# Patient Record
Sex: MALE | Race: BLACK OR AFRICAN AMERICAN | NOT HISPANIC OR LATINO | Employment: FULL TIME | URBAN - METROPOLITAN AREA
[De-identification: names, ages, dates, MRNs, and addresses within clinical notes are randomized per-mention and may not be internally consistent; named-entity substitution may affect disease eponyms.]

---

## 2021-06-03 ENCOUNTER — RECORDS - HEALTHEAST (OUTPATIENT)
Dept: ADMINISTRATIVE | Facility: CLINIC | Age: 46
End: 2021-06-03

## 2024-02-23 ENCOUNTER — HOSPITAL ENCOUNTER (EMERGENCY)
Facility: HOSPITAL | Age: 49
Discharge: HOME | End: 2024-02-23
Attending: EMERGENCY MEDICINE | Admitting: EMERGENCY MEDICINE
Payer: COMMERCIAL

## 2024-02-23 ENCOUNTER — APPOINTMENT (OUTPATIENT)
Dept: RADIOLOGY | Facility: HOSPITAL | Age: 49
End: 2024-02-23
Attending: EMERGENCY MEDICINE
Payer: COMMERCIAL

## 2024-02-23 VITALS
RESPIRATION RATE: 16 BRPM | SYSTOLIC BLOOD PRESSURE: 164 MMHG | OXYGEN SATURATION: 100 % | TEMPERATURE: 98.4 F | DIASTOLIC BLOOD PRESSURE: 97 MMHG | WEIGHT: 216.5 LBS | HEART RATE: 76 BPM

## 2024-02-23 DIAGNOSIS — R00.2 PALPITATIONS: ICD-10-CM

## 2024-02-23 DIAGNOSIS — I10 ACCELERATED HYPERTENSION: ICD-10-CM

## 2024-02-23 LAB
ALBUMIN UR-MCNC: NEGATIVE MG/DL
ANION GAP SERPL CALCULATED.3IONS-SCNC: 10 MMOL/L (ref 7–15)
APPEARANCE UR: CLEAR
BILIRUB UR QL STRIP: NEGATIVE
BUN SERPL-MCNC: 12.1 MG/DL (ref 6–20)
CALCIUM SERPL-MCNC: 9.1 MG/DL (ref 8.6–10)
CHLORIDE SERPL-SCNC: 105 MMOL/L (ref 98–107)
COLOR UR AUTO: COLORLESS
CREAT SERPL-MCNC: 1.23 MG/DL (ref 0.67–1.17)
DEPRECATED HCO3 PLAS-SCNC: 27 MMOL/L (ref 22–29)
EGFRCR SERPLBLD CKD-EPI 2021: 72 ML/MIN/1.73M2
ERYTHROCYTE [DISTWIDTH] IN BLOOD BY AUTOMATED COUNT: 13.7 % (ref 10–15)
GLUCOSE SERPL-MCNC: 104 MG/DL (ref 70–99)
GLUCOSE UR STRIP-MCNC: NEGATIVE MG/DL
HCT VFR BLD AUTO: 43.6 % (ref 40–53)
HGB BLD-MCNC: 14.4 G/DL (ref 13.3–17.7)
HGB UR QL STRIP: NEGATIVE
KETONES UR STRIP-MCNC: NEGATIVE MG/DL
LEUKOCYTE ESTERASE UR QL STRIP: NEGATIVE
MCH RBC QN AUTO: 27.6 PG (ref 26.5–33)
MCHC RBC AUTO-ENTMCNC: 33 G/DL (ref 31.5–36.5)
MCV RBC AUTO: 84 FL (ref 78–100)
NITRATE UR QL: NEGATIVE
PH UR STRIP: 5.5 [PH] (ref 5–7)
PLATELET # BLD AUTO: 238 10E3/UL (ref 150–450)
POTASSIUM SERPL-SCNC: 3.8 MMOL/L (ref 3.4–5.3)
RBC # BLD AUTO: 5.22 10E6/UL (ref 4.4–5.9)
RBC URINE: 1 /HPF
SODIUM SERPL-SCNC: 142 MMOL/L (ref 135–145)
SP GR UR STRIP: 1.01 (ref 1–1.03)
SQUAMOUS EPITHELIAL: <1 /HPF
TROPONIN T SERPL HS-MCNC: 7 NG/L
UROBILINOGEN UR STRIP-MCNC: <2 MG/DL
WBC # BLD AUTO: 7.1 10E3/UL (ref 4–11)
WBC URINE: 1 /HPF

## 2024-02-23 PROCEDURE — 81001 URINALYSIS AUTO W/SCOPE: CPT | Performed by: EMERGENCY MEDICINE

## 2024-02-23 PROCEDURE — 80048 BASIC METABOLIC PNL TOTAL CA: CPT | Performed by: EMERGENCY MEDICINE

## 2024-02-23 PROCEDURE — 250N000011 HC RX IP 250 OP 636: Performed by: EMERGENCY MEDICINE

## 2024-02-23 PROCEDURE — 99285 EMERGENCY DEPT VISIT HI MDM: CPT | Mod: 25

## 2024-02-23 PROCEDURE — 36415 COLL VENOUS BLD VENIPUNCTURE: CPT | Performed by: EMERGENCY MEDICINE

## 2024-02-23 PROCEDURE — 96374 THER/PROPH/DIAG INJ IV PUSH: CPT

## 2024-02-23 PROCEDURE — 93005 ELECTROCARDIOGRAM TRACING: CPT | Performed by: EMERGENCY MEDICINE

## 2024-02-23 PROCEDURE — 71046 X-RAY EXAM CHEST 2 VIEWS: CPT

## 2024-02-23 PROCEDURE — 93005 ELECTROCARDIOGRAM TRACING: CPT | Performed by: STUDENT IN AN ORGANIZED HEALTH CARE EDUCATION/TRAINING PROGRAM

## 2024-02-23 PROCEDURE — 85027 COMPLETE CBC AUTOMATED: CPT | Performed by: EMERGENCY MEDICINE

## 2024-02-23 PROCEDURE — 84484 ASSAY OF TROPONIN QUANT: CPT | Performed by: EMERGENCY MEDICINE

## 2024-02-23 RX ORDER — HYDRALAZINE HYDROCHLORIDE 20 MG/ML
10 INJECTION INTRAMUSCULAR; INTRAVENOUS ONCE
Status: COMPLETED | OUTPATIENT
Start: 2024-02-23 | End: 2024-02-23

## 2024-02-23 RX ORDER — HYDRALAZINE HYDROCHLORIDE 10 MG/1
10 TABLET, FILM COATED ORAL EVERY 8 HOURS PRN
Qty: 14 TABLET | Refills: 0 | Status: SHIPPED | OUTPATIENT
Start: 2024-02-23

## 2024-02-23 RX ADMIN — HYDRALAZINE HYDROCHLORIDE 10 MG: 20 INJECTION INTRAMUSCULAR; INTRAVENOUS at 20:09

## 2024-02-23 ASSESSMENT — ACTIVITIES OF DAILY LIVING (ADL)
ADLS_ACUITY_SCORE: 35

## 2024-02-23 ASSESSMENT — COLUMBIA-SUICIDE SEVERITY RATING SCALE - C-SSRS
1. IN THE PAST MONTH, HAVE YOU WISHED YOU WERE DEAD OR WISHED YOU COULD GO TO SLEEP AND NOT WAKE UP?: NO
2. HAVE YOU ACTUALLY HAD ANY THOUGHTS OF KILLING YOURSELF IN THE PAST MONTH?: NO
6. HAVE YOU EVER DONE ANYTHING, STARTED TO DO ANYTHING, OR PREPARED TO DO ANYTHING TO END YOUR LIFE?: NO

## 2024-02-24 NOTE — DISCHARGE INSTRUCTIONS
Please follow-up with your Primary Care Provider on Monday or Tuesday for a recheck; call to arrange appointment.    I recommend checking and recording your blood pressures at home in the meantime. If your blood pressures are persistently greater than 160 (upper number) for 2 checks within 15-30 minutes, take a dose of Hydralazine (every 8 hours, as needed for persistently elevated blood pressures).     Decrease caffeine intake, as we discussed.    You may need Holter monitoring again if you continue to have palpitations - please discuss with your primary care provider.    Return to the ER for worsening symptoms, worsening palpitations, if you have chest pain, shortness of breath, if you pass out or feel that you might, persistent nausea / vomiting, fever or other concerns.

## 2024-02-24 NOTE — ED NOTES
Bed: [facility]  Expected date:   Expected time:   Means of arrival: Walked  Comments:

## 2024-02-24 NOTE — ED PROVIDER NOTES
Emergency Department Encounter     Evaluation Date & Time:   2024  6:47 PM    CHIEF COMPLAINT:  Hypertension      Triage Note:Pt here after going to the dentist this morning and they would not do the cleaning due to his BP being elevated. PT currently on verapamil. Check BP at home and was still high, called 911. They came and did BP,check an EKG and advised him to be seen in ER. PT does have some discomfort in his chest at this time.      Triage Assessment (Adult)       Row Name [id number]          Triage Assessment    Airway WDL WDL        Respiratory WDL    Respiratory WDL WDL        Skin Circulation/Temperature WDL    Skin Circulation/Temperature WDL WDL        Cardiac WDL    Cardiac WDL X;chest pain        Chest Pain Assessment    Chest Pain Location midsternal     Character --  uncomfortable                     Impression and Plan       FINAL IMPRESSION:    ICD-10-CM    1. Accelerated hypertension  I10       2. Palpitations  R00.2             ED COURSE & MEDICAL DECISION MAKIN:00 PM I met with the patient and performed the physical exam.   8:50 PM I rechecked and updated the patient.   9:05 PM We discussed the plan for discharge and the patient is agreeable. Reviewed supportive cares, symptomatic treatment, outpatient follow up, and reasons to return to the Emergency Department. Patient to be   discharged by ED RN.        48 year old male, history of HTN and migraine headaches, who presents for evaluation of HTN.    Patient went to the dentist this morning, however they did not perform his cleaning and exam secondary to elevated blood pressure (170 / 100). He reports that his blood pressure is usually ~150s / 90s and he has been compliant with his verapamil.    He reports some heart palpitations and minor left chest tightness, which feels like a tight muscle or as if he had performed several push-up, worse with movement. The palpitations and chest tightness have been ongoing throughout the day.  No lightheadedness, shortness of breath, nausea / vomiting or headache. Patient endorses similar palpitations for which he wore a Holter monitor over the summer with unremarkable results.     His wife reports that earlier this week he has had a couple [place] coffee drinks, which have higher caffeine content.     On exam, he has RRR with clear, symmetrical breath sounds.    Blood pressure on presentation elevated to 212/129.    EKG performed and demonstrated NSR with LVH and no ischemic changes. Troponin WNL (7); a single, normal troponin is reassuring that symptoms are not secondary to ACS given that they have been ongoing for >6 hours and I do not think serial troponin testing is indicated.    I do not suspect aortic dissection and risks of CTA chest felt to outweigh benefit. CXR performed and was negative.    Labs otherwise remarkable for no leukocytosis, anemia, electrolyte derangements or renal impairment.  UA negative for infection, hematuria and proteinuria.    Patient's blood pressure remained elevated on recheck (214/115) for which he was given 10 mg IV Hydralazine with improvement (160s / 90s).    Patient is feeling well and is comfortable with discharge to home. I advised him to monitor and record his blood pressures at home and to follow-up with his PCP early this upcoming week for a recheck. I prescribed Hydralazine 10mg po Q8 hours, prn blood pressures persistently >160s to take in the meantime. I also recommended decreased caffeine use. Return precautions provided. Patient stable throughout ED course.         At the conclusion of the encounter I discussed the results of all the tests and the disposition. The questions were answered. The patient and family acknowledged understanding and were agreeable with the care plan.      Medical Decision Making  Obtained supplemental history: Wife  Reviewed external records: External records reviewed?: No  Care impacted by chronic illness:Hypertension and  "Other: chronic migraine  Care significantly affected by social determinants of health:N/A  Did you consider but not order tests?: Work up considered but not performed and documented in chart, if applicable  Consultation discussion with other provider:Did you involve another provider (consultant, , pharmacy, etc.)?: No  Discharge. I prescribed additional prescription strength medication(s) as charted. I considered admission, but ultimately discharged patient given reassuring evaluation and clinical improvement.    MEDICATIONS GIVEN IN THE EMERGENCY DEPARTMENT:  Medications   hydrALAZINE (APRESOLINE) injection 10 mg (10 mg Intravenous $Given 2/23/24 2009)       NEW PRESCRIPTIONS STARTED AT TODAY'S ED VISIT:  Discharge Medication List as of 2/23/2024  9:13 PM        START taking these medications    Details   hydrALAZINE (APRESOLINE) 10 MG tablet Take 1 tablet (10 mg) by mouth every 8 hours as needed for high blood pressure (if your blood pressure is persistently greater than 160 systolic (upper number)), Disp-14 tablet, R-0, Local Print             HPI     The history is provided by the patient. No  was used.        Juan Pablo Barba is a 48 year old male, history of HTN and migraine headaches, who presents to this ED via walk-in for evaluation of HTN.    Patient went to the dentist this morning, however they did not perform his cleaning and exam secondary to elevated blood pressure (170 / 100). He reports that his blood pressure is usually ~150s / 90s. He has been compliant with his verapamil with no recent medication changes or life stressors.    He reports some heart palpitations today that feel like his heartbeat jumps, \"like when you get scared\". He reports minor left chest tightness, which feels like a tight muscle or as if he had performed several push-up. This tightness worsens with movement. The palpitations and chest tightness have been ongoing throughout the day. He denies associated " lightheadedness, shortness of breath, nausea / vomiting and headache.     Patient endorses similar palpitations for which he wore a Holter monitor over the summer with unremarkable results.     He has otherwise been in his usual state of health and denies abdominal pain, diarrhea, cough, fever or other concerns.    REVIEW OF SYSTEMS:  All other systems reviewed and are negative.      Medical History     History reviewed. No pertinent past medical history.    History reviewed. No pertinent surgical history.    History reviewed. No pertinent family history.         hydrALAZINE (APRESOLINE) 10 MG tablet        Physical Exam     First Vitals:  Patient Vitals for the past 24 hrs:   BP Temp Temp src Pulse Resp SpO2 Weight   02/23/24 2100 (!) 164/97 -- -- 76 -- 100 % --   02/23/24 2045 (!) 172/106 -- -- 77 -- 100 % --   02/23/24 2030 (!) 183/100 -- -- 81 -- 100 % --   02/23/24 2015 (!) 187/103 -- -- 68 -- 100 % --   02/23/24 1908 (!) 214/115 -- -- -- -- -- --   02/23/24 1830 (!) 212/129 -- -- -- -- -- --   02/23/24 1826 -- 98.4  F (36.9  C) Temporal 80 16 100 % 98.2 kg (216 lb 8 oz)       PHYSICAL EXAM:   Physical Exam    GENERAL: Awake, alert.  In no acute distress.   HEENT: Normocephalic, atraumatic. Pupils equal, round and reactive. Conjunctiva normal. EOMI.  NECK: No stridor.  PULMONARY: Symmetrical breath sounds without distress.  Lungs clear to auscultation bilaterally without wheezes, rhonchi or rales.  CARDIO: Regular rate and rhythm.  No significant murmur, rub or gallop.  Radial pulses strong and symmetrical.  ABDOMINAL: Abdomen soft, non-distended and non-tender to palpation.  No CVAT, BL.  EXTREMITIES: No lower extremity swelling or edema.      NEURO: Alert and oriented to person, place and time.  Cranial nerves grossly intact.  No focal motor deficit.  PSYCH: Normal mood and affect.  SKIN: No rashes.     Results     LAB:  All pertinent labs reviewed and interpreted  Labs Ordered and Resulted from Time of ED  Arrival to Time of ED Departure   BASIC METABOLIC PANEL - Abnormal       Result Value    Sodium 142      Potassium 3.8      Chloride 105      Carbon Dioxide (CO2) 27      Anion Gap 10      Urea Nitrogen 12.1      Creatinine 1.23 (*)     GFR Estimate 72      Calcium 9.1      Glucose 104 (*)    CBC WITH PLATELETS - Normal    WBC Count 7.1      RBC Count 5.22      Hemoglobin 14.4      Hematocrit 43.6      MCV 84      MCH 27.6      MCHC 33.0      RDW 13.7      Platelet Count 238     TROPONIN T, HIGH SENSITIVITY - Normal    Troponin T, High Sensitivity 7     ROUTINE UA WITH MICROSCOPIC REFLEX TO CULTURE - Normal    Color Urine Colorless      Appearance Urine Clear      Glucose Urine Negative      Bilirubin Urine Negative      Ketones Urine Negative      Specific Gravity Urine 1.006      Blood Urine Negative      pH Urine 5.5      Protein Albumin Urine Negative      Urobilinogen Urine <2.0      Nitrite Urine Negative      Leukocyte Esterase Urine Negative      RBC Urine 1      WBC Urine 1      Squamous Epithelials Urine <1         RADIOLOGY:  Chest XR,  PA & LAT   Final Result   IMPRESSION: Negative chest.        EC2024, 18:37; NSR with rate of 61 bpm; normal intervals; normal conduction; LVH; no ST-T wave changes consistent with ACS or pericarditis; no previous EKG available for comparison    EKG independently reviewed and interpreted by Vivian Hernandez MD        I, Yolis Liz, am serving as a scribe to document services personally performed by Vivian Hernandez MD based on my observation and the provider's statements to me. I, Vivian Hernandez MD attest that Yolis Liz is acting in a scribe capacity, has observed my performance of the services and has documented them in accordance with my direction.    Vivian Hernandez MD  Emergency Medicine  [facility] EMERGENCY DEPARTMENT           Vivian Hernandez MD  [id number]

## 2024-02-24 NOTE — ED TRIAGE NOTES
Pt here after going to the dentist this morning and they would not do the cleaning due to his BP being elevated. PT currently on verapamil. Check BP at home and was still high, called 911. They came and did BP,check an EKG and advised him to be seen in ER. PT does have some discomfort in his chest at this time.      Triage Assessment (Adult)       Row Name 02/23/24 1064          Triage Assessment    Airway WDL WDL        Respiratory WDL    Respiratory WDL WDL        Skin Circulation/Temperature WDL    Skin Circulation/Temperature WDL WDL        Cardiac WDL    Cardiac WDL X;chest pain        Chest Pain Assessment    Chest Pain Location midsternal     Character --  uncomfortable

## 2024-03-01 LAB
ATRIAL RATE - MUSE: 61 BPM
DIASTOLIC BLOOD PRESSURE - MUSE: NORMAL MMHG
INTERPRETATION ECG - MUSE: NORMAL
P AXIS - MUSE: 48 DEGREES
PR INTERVAL - MUSE: 166 MS
QRS DURATION - MUSE: 120 MS
QT - MUSE: 418 MS
QTC - MUSE: 420 MS
R AXIS - MUSE: 13 DEGREES
SYSTOLIC BLOOD PRESSURE - MUSE: NORMAL MMHG
T AXIS - MUSE: 32 DEGREES
VENTRICULAR RATE- MUSE: 61 BPM